# Patient Record
Sex: FEMALE | Race: WHITE | NOT HISPANIC OR LATINO | URBAN - METROPOLITAN AREA
[De-identification: names, ages, dates, MRNs, and addresses within clinical notes are randomized per-mention and may not be internally consistent; named-entity substitution may affect disease eponyms.]

---

## 2017-03-07 ENCOUNTER — OUTPATIENT (OUTPATIENT)
Dept: OUTPATIENT SERVICES | Facility: HOSPITAL | Age: 37
LOS: 1 days | End: 2017-03-07
Payer: COMMERCIAL

## 2017-03-07 DIAGNOSIS — O26.899 OTHER SPECIFIED PREGNANCY RELATED CONDITIONS, UNSPECIFIED TRIMESTER: ICD-10-CM

## 2017-03-07 DIAGNOSIS — Z3A.00 WEEKS OF GESTATION OF PREGNANCY NOT SPECIFIED: ICD-10-CM

## 2017-03-07 LAB
APPEARANCE UR: CLEAR — SIGNIFICANT CHANGE UP
BILIRUB UR-MCNC: NEGATIVE — SIGNIFICANT CHANGE UP
COLOR SPEC: YELLOW — SIGNIFICANT CHANGE UP
DIFF PNL FLD: (no result)
GLUCOSE UR QL: NEGATIVE — SIGNIFICANT CHANGE UP
KETONES UR-MCNC: NEGATIVE — SIGNIFICANT CHANGE UP
LEUKOCYTE ESTERASE UR-ACNC: NEGATIVE — SIGNIFICANT CHANGE UP
NITRITE UR-MCNC: NEGATIVE — SIGNIFICANT CHANGE UP
PH UR: 6.5 — SIGNIFICANT CHANGE UP (ref 4–8)
PROT UR-MCNC: NEGATIVE MG/DL — SIGNIFICANT CHANGE UP
SP GR SPEC: <=1.005 — SIGNIFICANT CHANGE UP (ref 1–1.03)
UROBILINOGEN FLD QL: 0.2 E.U./DL — SIGNIFICANT CHANGE UP

## 2017-03-07 PROCEDURE — 76817 TRANSVAGINAL US OBSTETRIC: CPT

## 2017-03-07 PROCEDURE — 87086 URINE CULTURE/COLONY COUNT: CPT

## 2017-03-07 PROCEDURE — 81003 URINALYSIS AUTO W/O SCOPE: CPT

## 2017-03-07 PROCEDURE — 81001 URINALYSIS AUTO W/SCOPE: CPT

## 2017-03-07 PROCEDURE — 99214 OFFICE O/P EST MOD 30 MIN: CPT

## 2017-03-07 PROCEDURE — 76818 FETAL BIOPHYS PROFILE W/NST: CPT

## 2017-03-07 RX ORDER — SODIUM CHLORIDE 9 MG/ML
1000 INJECTION, SOLUTION INTRAVENOUS ONCE
Qty: 0 | Refills: 0 | Status: COMPLETED | OUTPATIENT
Start: 2017-03-07 | End: 2017-03-07

## 2017-03-07 RX ADMIN — SODIUM CHLORIDE 2000 MILLILITER(S): 9 INJECTION, SOLUTION INTRAVENOUS at 16:26

## 2017-03-08 LAB
CULTURE RESULTS: NO GROWTH — SIGNIFICANT CHANGE UP
SPECIMEN SOURCE: SIGNIFICANT CHANGE UP

## 2017-05-19 ENCOUNTER — INPATIENT (INPATIENT)
Facility: HOSPITAL | Age: 37
LOS: 3 days | Discharge: HOME | End: 2017-05-23
Attending: OBSTETRICS & GYNECOLOGY | Admitting: OBSTETRICS & GYNECOLOGY
Payer: COMMERCIAL

## 2017-05-19 VITALS — WEIGHT: 158.07 LBS | HEIGHT: 61 IN

## 2017-05-19 LAB
BASOPHILS NFR BLD AUTO: 0.3 % — SIGNIFICANT CHANGE UP (ref 0–2)
BLD GP AB SCN SERPL QL: NEGATIVE — SIGNIFICANT CHANGE UP
EOSINOPHIL NFR BLD AUTO: 0.3 % — SIGNIFICANT CHANGE UP (ref 0–6)
HCT VFR BLD CALC: 33.9 % — LOW (ref 34.5–45)
HGB BLD-MCNC: 11.7 G/DL — SIGNIFICANT CHANGE UP (ref 11.5–15.5)
LYMPHOCYTES # BLD AUTO: 20.6 % — SIGNIFICANT CHANGE UP (ref 13–44)
MCHC RBC-ENTMCNC: 31.3 PG — SIGNIFICANT CHANGE UP (ref 27–34)
MCHC RBC-ENTMCNC: 34.5 G/DL — SIGNIFICANT CHANGE UP (ref 32–36)
MCV RBC AUTO: 90.6 FL — SIGNIFICANT CHANGE UP (ref 80–100)
MONOCYTES NFR BLD AUTO: 7.3 % — SIGNIFICANT CHANGE UP (ref 2–14)
NEUTROPHILS NFR BLD AUTO: 71.5 % — SIGNIFICANT CHANGE UP (ref 43–77)
PLATELET # BLD AUTO: 169 K/UL — SIGNIFICANT CHANGE UP (ref 150–400)
RBC # BLD: 3.74 M/UL — LOW (ref 3.8–5.2)
RBC # FLD: 13.9 % — SIGNIFICANT CHANGE UP (ref 10.3–16.9)
RH IG SCN BLD-IMP: POSITIVE — SIGNIFICANT CHANGE UP
WBC # BLD: 7.8 K/UL — SIGNIFICANT CHANGE UP (ref 3.8–10.5)
WBC # FLD AUTO: 7.8 K/UL — SIGNIFICANT CHANGE UP (ref 3.8–10.5)

## 2017-05-19 RX ORDER — METOCLOPRAMIDE HCL 10 MG
10 TABLET ORAL ONCE
Qty: 0 | Refills: 0 | Status: DISCONTINUED | OUTPATIENT
Start: 2017-05-19 | End: 2017-05-19

## 2017-05-19 RX ORDER — OXYTOCIN 10 UNIT/ML
41.67 VIAL (ML) INJECTION
Qty: 20 | Refills: 0 | Status: DISCONTINUED | OUTPATIENT
Start: 2017-05-19 | End: 2017-05-23

## 2017-05-19 RX ORDER — ACETAMINOPHEN 500 MG
650 TABLET ORAL EVERY 6 HOURS
Qty: 0 | Refills: 0 | Status: DISCONTINUED | OUTPATIENT
Start: 2017-05-19 | End: 2017-05-23

## 2017-05-19 RX ORDER — SODIUM CHLORIDE 9 MG/ML
1000 INJECTION, SOLUTION INTRAVENOUS ONCE
Qty: 0 | Refills: 0 | Status: COMPLETED | OUTPATIENT
Start: 2017-05-19 | End: 2017-05-19

## 2017-05-19 RX ORDER — IBUPROFEN 200 MG
600 TABLET ORAL EVERY 6 HOURS
Qty: 0 | Refills: 0 | Status: DISCONTINUED | OUTPATIENT
Start: 2017-05-19 | End: 2017-05-23

## 2017-05-19 RX ORDER — CITRIC ACID/SODIUM CITRATE 300-500 MG
30 SOLUTION, ORAL ORAL ONCE
Qty: 0 | Refills: 0 | Status: COMPLETED | OUTPATIENT
Start: 2017-05-19 | End: 2017-05-19

## 2017-05-19 RX ORDER — GENTAMICIN SULFATE 40 MG/ML
210 VIAL (ML) INJECTION ONCE
Qty: 0 | Refills: 0 | Status: DISCONTINUED | OUTPATIENT
Start: 2017-05-19 | End: 2017-05-19

## 2017-05-19 RX ORDER — NALOXONE HYDROCHLORIDE 4 MG/.1ML
0.1 SPRAY NASAL
Qty: 0 | Refills: 0 | Status: DISCONTINUED | OUTPATIENT
Start: 2017-05-19 | End: 2017-05-23

## 2017-05-19 RX ORDER — HEPARIN SODIUM 5000 [USP'U]/ML
5000 INJECTION INTRAVENOUS; SUBCUTANEOUS EVERY 12 HOURS
Qty: 0 | Refills: 0 | Status: DISCONTINUED | OUTPATIENT
Start: 2017-05-20 | End: 2017-05-23

## 2017-05-19 RX ORDER — OXYTOCIN 10 UNIT/ML
333.33 VIAL (ML) INJECTION
Qty: 20 | Refills: 0 | Status: DISCONTINUED | OUTPATIENT
Start: 2017-05-19 | End: 2017-05-23

## 2017-05-19 RX ORDER — SODIUM CHLORIDE 9 MG/ML
1000 INJECTION, SOLUTION INTRAVENOUS
Qty: 0 | Refills: 0 | Status: DISCONTINUED | OUTPATIENT
Start: 2017-05-19 | End: 2017-05-19

## 2017-05-19 RX ORDER — DOCUSATE SODIUM 100 MG
100 CAPSULE ORAL
Qty: 0 | Refills: 0 | Status: DISCONTINUED | OUTPATIENT
Start: 2017-05-19 | End: 2017-05-23

## 2017-05-19 RX ORDER — GLYCERIN ADULT
1 SUPPOSITORY, RECTAL RECTAL AT BEDTIME
Qty: 0 | Refills: 0 | Status: DISCONTINUED | OUTPATIENT
Start: 2017-05-19 | End: 2017-05-23

## 2017-05-19 RX ORDER — SIMETHICONE 80 MG/1
80 TABLET, CHEWABLE ORAL EVERY 4 HOURS
Qty: 0 | Refills: 0 | Status: DISCONTINUED | OUTPATIENT
Start: 2017-05-19 | End: 2017-05-23

## 2017-05-19 RX ORDER — DIPHENHYDRAMINE HCL 50 MG
25 CAPSULE ORAL EVERY 6 HOURS
Qty: 0 | Refills: 0 | Status: DISCONTINUED | OUTPATIENT
Start: 2017-05-19 | End: 2017-05-23

## 2017-05-19 RX ORDER — SODIUM CHLORIDE 9 MG/ML
1000 INJECTION, SOLUTION INTRAVENOUS
Qty: 0 | Refills: 0 | Status: DISCONTINUED | OUTPATIENT
Start: 2017-05-19 | End: 2017-05-23

## 2017-05-19 RX ORDER — LANOLIN
1 OINTMENT (GRAM) TOPICAL
Qty: 0 | Refills: 0 | Status: DISCONTINUED | OUTPATIENT
Start: 2017-05-19 | End: 2017-05-23

## 2017-05-19 RX ORDER — ONDANSETRON 8 MG/1
4 TABLET, FILM COATED ORAL EVERY 6 HOURS
Qty: 0 | Refills: 0 | Status: DISCONTINUED | OUTPATIENT
Start: 2017-05-19 | End: 2017-05-23

## 2017-05-19 RX ORDER — TETANUS TOXOID, REDUCED DIPHTHERIA TOXOID AND ACELLULAR PERTUSSIS VACCINE, ADSORBED 5; 2.5; 8; 8; 2.5 [IU]/.5ML; [IU]/.5ML; UG/.5ML; UG/.5ML; UG/.5ML
0.5 SUSPENSION INTRAMUSCULAR ONCE
Qty: 0 | Refills: 0 | Status: COMPLETED | OUTPATIENT
Start: 2017-05-19

## 2017-05-19 RX ORDER — FERROUS SULFATE 325(65) MG
325 TABLET ORAL DAILY
Qty: 0 | Refills: 0 | Status: DISCONTINUED | OUTPATIENT
Start: 2017-05-19 | End: 2017-05-23

## 2017-05-19 RX ADMIN — SODIUM CHLORIDE 125 MILLILITER(S): 9 INJECTION, SOLUTION INTRAVENOUS at 10:11

## 2017-05-19 RX ADMIN — SODIUM CHLORIDE 2000 MILLILITER(S): 9 INJECTION, SOLUTION INTRAVENOUS at 10:00

## 2017-05-19 RX ADMIN — Medication 100 MILLIGRAM(S): at 12:31

## 2017-05-19 RX ADMIN — Medication 1000 MILLIUNIT(S)/MIN: at 13:58

## 2017-05-19 RX ADMIN — Medication 100 MILLIGRAM(S): at 23:20

## 2017-05-19 RX ADMIN — Medication 600 MILLIGRAM(S): at 23:20

## 2017-05-19 RX ADMIN — Medication 125 MILLIUNIT(S)/MIN: at 14:12

## 2017-05-19 RX ADMIN — Medication 30 MILLILITER(S): at 12:32

## 2017-05-19 RX ADMIN — Medication 125 MILLIUNIT(S)/MIN: at 13:58

## 2017-05-20 LAB
HCT VFR BLD CALC: 30.5 % — LOW (ref 34.5–45)
HGB BLD-MCNC: 10.1 G/DL — LOW (ref 11.5–15.5)
MCHC RBC-ENTMCNC: 30.8 PG — SIGNIFICANT CHANGE UP (ref 27–34)
MCHC RBC-ENTMCNC: 33.1 G/DL — SIGNIFICANT CHANGE UP (ref 32–36)
MCV RBC AUTO: 93 FL — SIGNIFICANT CHANGE UP (ref 80–100)
PLATELET # BLD AUTO: 130 K/UL — LOW (ref 150–400)
RBC # BLD: 3.28 M/UL — LOW (ref 3.8–5.2)
RBC # FLD: 13.5 % — SIGNIFICANT CHANGE UP (ref 10.3–16.9)
T PALLIDUM AB TITR SER: NEGATIVE — SIGNIFICANT CHANGE UP
WBC # BLD: 8.3 K/UL — SIGNIFICANT CHANGE UP (ref 3.8–10.5)
WBC # FLD AUTO: 8.3 K/UL — SIGNIFICANT CHANGE UP (ref 3.8–10.5)

## 2017-05-20 RX ORDER — TETANUS TOXOID, REDUCED DIPHTHERIA TOXOID AND ACELLULAR PERTUSSIS VACCINE, ADSORBED 5; 2.5; 8; 8; 2.5 [IU]/.5ML; [IU]/.5ML; UG/.5ML; UG/.5ML; UG/.5ML
0.5 SUSPENSION INTRAMUSCULAR ONCE
Qty: 0 | Refills: 0 | Status: COMPLETED | OUTPATIENT
Start: 2017-05-20 | End: 2017-05-20

## 2017-05-20 RX ADMIN — HEPARIN SODIUM 5000 UNIT(S): 5000 INJECTION INTRAVENOUS; SUBCUTANEOUS at 13:19

## 2017-05-20 RX ADMIN — Medication 100 MILLIGRAM(S): at 13:18

## 2017-05-20 RX ADMIN — Medication 325 MILLIGRAM(S): at 13:18

## 2017-05-20 RX ADMIN — TETANUS TOXOID, REDUCED DIPHTHERIA TOXOID AND ACELLULAR PERTUSSIS VACCINE, ADSORBED 0.5 MILLILITER(S): 5; 2.5; 8; 8; 2.5 SUSPENSION INTRAMUSCULAR at 22:54

## 2017-05-20 RX ADMIN — Medication 600 MILLIGRAM(S): at 13:18

## 2017-05-20 RX ADMIN — Medication 600 MILLIGRAM(S): at 14:00

## 2017-05-20 RX ADMIN — HEPARIN SODIUM 5000 UNIT(S): 5000 INJECTION INTRAVENOUS; SUBCUTANEOUS at 02:53

## 2017-05-20 RX ADMIN — Medication 600 MILLIGRAM(S): at 00:12

## 2017-05-20 RX ADMIN — Medication 600 MILLIGRAM(S): at 06:43

## 2017-05-20 RX ADMIN — Medication 100 MILLIGRAM(S): at 04:35

## 2017-05-20 RX ADMIN — Medication 600 MILLIGRAM(S): at 19:51

## 2017-05-20 RX ADMIN — Medication 600 MILLIGRAM(S): at 07:30

## 2017-05-20 RX ADMIN — Medication 1 TABLET(S): at 13:18

## 2017-05-20 NOTE — PROGRESS NOTE ADULT - ASSESSMENT
post-op day #1 s/p repeat scheduled  section  1. Vital signs stable, continue to monitor per protocol  2. Pain controlled with oral pain medication  3. DVT prophylaxis: scd and heparin  4. GI/: clear liquid diet until flatus  5. Disposition: oob and ambulate. cbc pending.

## 2017-05-20 NOTE — PROVIDER CONTACT NOTE (CHANGE IN STATUS NOTIFICATION) - SITUATION
pt observed bleeding while transferring from chair to bed; pt's dov with a 40% saturation; clot observed coming out; pt denies dizziness and lightheadedness

## 2017-05-20 NOTE — PROGRESS NOTE ADULT - SUBJECTIVE AND OBJECTIVE BOX
Patient evaluated at bedside.   She reports pain is well controlled with oral pain medication  She denies headache, dizziness, chest pain, palpitations, shortness of breathe, nausea, vomiting or heavy vaginal bleeding.  She has been out of bed to chair, and feeling well. No chestpain, shortness of breath, or palpitations. She is tolerating clear liquid diet. She is  breastfeeding.    Physical Exam:  Vital Signs Last 24 Hrs  T(C): 36.7, Max: 37.1 (05-19 @ 13:45)  T(F): 98, Max: 98.8 (05-19 @ 13:45)  HR: 64 (56 - 92)  BP: 94/60 (83/53 - 127/78)  BP(mean): --  RR: 18 (14 - 19)  SpO2: 96% (95% - 100%)    GA: NAD, A+0 x 3  CV: RRR  Pulm: CTAB  Breasts: soft, nontender, no palpable masses  Abd: + BS, soft, nontender, nondistended, no rebound or guarding, incision clean, dry and intact, uterus firm at midline, 2 fb below umbilicus  : lochia wnl. Bimanual exam performed, as nurse expressed large clot. Cervix 1cm dilated. No active bleeding with uterine massage.   Adler: draining clear yellow urine with adequate output  Extremities: no swelling or calf tenderness, reflexes +2 bilaterally                            11.7   7.8   )-----------( 169      ( 19 May 2017 10:30 )             33.9

## 2017-05-21 RX ADMIN — HEPARIN SODIUM 5000 UNIT(S): 5000 INJECTION INTRAVENOUS; SUBCUTANEOUS at 00:27

## 2017-05-21 RX ADMIN — Medication 100 MILLIGRAM(S): at 14:21

## 2017-05-21 RX ADMIN — Medication 600 MILLIGRAM(S): at 19:30

## 2017-05-21 RX ADMIN — Medication 600 MILLIGRAM(S): at 11:00

## 2017-05-21 RX ADMIN — Medication 600 MILLIGRAM(S): at 10:12

## 2017-05-21 RX ADMIN — Medication 650 MILLIGRAM(S): at 06:29

## 2017-05-21 RX ADMIN — HEPARIN SODIUM 5000 UNIT(S): 5000 INJECTION INTRAVENOUS; SUBCUTANEOUS at 14:20

## 2017-05-21 RX ADMIN — Medication 600 MILLIGRAM(S): at 01:37

## 2017-05-21 RX ADMIN — Medication 1 TABLET(S): at 14:20

## 2017-05-21 RX ADMIN — Medication 325 MILLIGRAM(S): at 14:20

## 2017-05-21 RX ADMIN — Medication 600 MILLIGRAM(S): at 02:07

## 2017-05-21 RX ADMIN — Medication 600 MILLIGRAM(S): at 18:45

## 2017-05-21 NOTE — PROGRESS NOTE ADULT - ASSESSMENT
37 year old patient post-op day #2 s/p repeat scheduled CS  1. Vital signs stable, continue to monitor per protocol  2. Pain controlled with oral pain medication  3. DVT prophylaxis: scd and heparin  4. GI/: regular diet  5. Disposition: out of bed and ambulate. routine post-op care

## 2017-05-21 NOTE — PROGRESS NOTE ADULT - SUBJECTIVE AND OBJECTIVE BOX
Patient evaluated at bedside.   She reports pain is well controlled with oral pain medication  She denies headache, dizziness, chest pain, palpitations, shortness of breathe, nausea, vomiting or heavy vaginal bleeding.  She has been ambulating without assistance, voiding spontaneously,  not yet passing gas, tolerating clear diet and is breastfeeding.    Physical Exam:  Vital Signs Last 24 Hrs  T(C): 36.6, Max: 36.8 (05-20 @ 20:57)  T(F): 97.8, Max: 98.2 (05-20 @ 20:57)  HR: 77 (72 - 77)  BP: 94/64 (89/56 - 100/64)  BP(mean): --  RR: 16 (16 - 20)  SpO2: 96% (96% - 96%)    GA: NAD, A+0 x 3  CV: RRR  Pulm: CTAB  Breasts: soft, nontender, no palpable masses  Abd: + BS, soft, nontender, nondistended, no rebound or guarding, incision clean, dry and intact, uterus firm at midline, 2 fb below umbilicus  : lochia WNL  Adler: removed and patient voiding  Extremities: no swelling or calf tenderness, reflexes +2 bilaterally                            10.1   8.3   )-----------( 130      ( 20 May 2017 15:42 )             30.5

## 2017-05-22 RX ORDER — OXYCODONE HYDROCHLORIDE 5 MG/1
1 TABLET ORAL
Qty: 0 | Refills: 0 | COMMUNITY
Start: 2017-05-22

## 2017-05-22 RX ORDER — ACETAMINOPHEN 500 MG
2 TABLET ORAL
Qty: 0 | Refills: 0 | COMMUNITY
Start: 2017-05-22

## 2017-05-22 RX ORDER — IBUPROFEN 200 MG
1 TABLET ORAL
Qty: 0 | Refills: 0 | COMMUNITY
Start: 2017-05-22

## 2017-05-22 RX ORDER — OXYCODONE HYDROCHLORIDE 5 MG/1
2 TABLET ORAL
Qty: 0 | Refills: 0 | COMMUNITY
Start: 2017-05-22

## 2017-05-22 RX ADMIN — Medication 600 MILLIGRAM(S): at 13:30

## 2017-05-22 RX ADMIN — HEPARIN SODIUM 5000 UNIT(S): 5000 INJECTION INTRAVENOUS; SUBCUTANEOUS at 12:47

## 2017-05-22 RX ADMIN — HEPARIN SODIUM 5000 UNIT(S): 5000 INJECTION INTRAVENOUS; SUBCUTANEOUS at 00:23

## 2017-05-22 RX ADMIN — Medication 600 MILLIGRAM(S): at 18:26

## 2017-05-22 RX ADMIN — Medication 325 MILLIGRAM(S): at 12:47

## 2017-05-22 RX ADMIN — Medication 600 MILLIGRAM(S): at 07:13

## 2017-05-22 RX ADMIN — Medication 100 MILLIGRAM(S): at 12:47

## 2017-05-22 RX ADMIN — Medication 1 TABLET(S): at 12:47

## 2017-05-22 RX ADMIN — Medication 600 MILLIGRAM(S): at 06:43

## 2017-05-22 RX ADMIN — Medication 600 MILLIGRAM(S): at 12:47

## 2017-05-22 RX ADMIN — Medication 600 MILLIGRAM(S): at 00:25

## 2017-05-22 RX ADMIN — Medication 600 MILLIGRAM(S): at 00:55

## 2017-05-22 RX ADMIN — Medication 600 MILLIGRAM(S): at 19:00

## 2017-05-22 NOTE — PROGRESS NOTE ADULT - SUBJECTIVE AND OBJECTIVE BOX
Patient evaluated at bedside.    She reports pain is well controlled with oral pain medication  She denies headache, dizziness, chest pain, palpitations, shortness of breathe, nausea, vomiting or heavy vaginal bleeding.  She has been ambulating without assistance, voiding spontaneously, passing gas, tolerating regular diet and is breastfeeding.     Physical Exam:  Vital Signs Last 24 Hrs  T(C): 36.7, Max: 36.7 (05-22 @ 06:03)  T(F): 98, Max: 98 (05-22 @ 06:03)  HR: 65 (64 - 65)  BP: 105/73 (105/73 - 119/81)  BP(mean): --  RR: 16 (16 - 16)  SpO2: 97% (97% - 98%)    GA: NAD, A+0 x 3  CV: RRR  Pulm: CTAB  Breasts: soft, nontender, no palpable masses  Abd: + BS, soft, nontender, nondistended, no rebound or guarding, incision clean, dry and intact, uterus firm at midline, 2 fb below umbilicus  : lochia WNL  Adler: VOIDING FREELY  Extremities: no swelling or calf tenderness, reflexes +2 bilaterally                            10.1   8.3   )-----------( 130      ( 20 May 2017 15:42 )             30.5

## 2017-05-22 NOTE — DISCHARGE NOTE OB - CARE PROVIDER_API CALL
Qamar Eduardo (MD), Obstetrics and Gynecology  [address] [place] [address], [place] [id number]  Phone: [phone]  Fax: [phone]

## 2017-05-22 NOTE — PROGRESS NOTE ADULT - ASSESSMENT
37 year old patient post-op day #3 s/p repeat  section at term  1. Vital signs stable, continue to monitor per protocol  2. Pain controlled with oral pain meds  3. DVT prophylaxis: scd and heparin. patient ambulating well  4. GI/: regular diet. patient passing flatus.   5. Disposition: anticipate DC tomorrow.

## 2017-05-22 NOTE — DISCHARGE NOTE OB - PLAN OF CARE
n/a Please see Dr. Eduardo in 2 weeks for a post-op check .Call the office for an appointment. Call the office if you experience severe pain, fever over 100.4, or severe nausea and vomiting.

## 2017-05-22 NOTE — DISCHARGE NOTE OB - CARE PLAN
Principal Discharge DX:	Postpartum state  Goal:	n/a  Instructions for follow-up, activity and diet:	Please see Dr. Eduardo in 2 weeks for a post-op check .Call the office for an appointment. Call the office if you experience severe pain, fever over 100.4, or severe nausea and vomiting.

## 2017-05-22 NOTE — DISCHARGE NOTE OB - PATIENT PORTAL LINK FT
“You can access the FollowHealth Patient Portal, offered by [facility], by registering with the following website: [email or web address]”

## 2017-05-22 NOTE — DISCHARGE NOTE OB - HOSPITAL COURSE
Patient presented for scheduled repeat  section. Uncomplicated surgical procedure. Patient had uneventful post-operative course. Discharged home in stable condition on post-op day #4 after meeting all post-operative milestones.

## 2017-05-22 NOTE — DISCHARGE NOTE OB - MEDICATION SUMMARY - MEDICATIONS TO TAKE
I will START or STAY ON the medications listed below when I get home from the hospital:    acetaminophen 325 mg oral tablet  -- 2 tab(s) by mouth every 6 hours, As needed, For Temp greater than 38.5 C (101.3 F)  -- Indication: For Fever    acetaminophen-oxycodone 325 mg-5 mg oral tablet  -- 1 tab(s) by mouth every 3 hours, As needed, Moderate Pain  -- Indication: For Pain    acetaminophen-oxycodone 325 mg-5 mg oral tablet  -- 2 tab(s) by mouth every 6 hours, As needed, Severe Pain  -- Indication: For Pain    ibuprofen 600 mg oral tablet  -- 1 tab(s) by mouth every 6 hours, As needed, Mild pain or headache  -- Indication: For Pain

## 2017-05-23 VITALS
TEMPERATURE: 98 F | SYSTOLIC BLOOD PRESSURE: 119 MMHG | DIASTOLIC BLOOD PRESSURE: 82 MMHG | HEART RATE: 60 BPM | RESPIRATION RATE: 18 BRPM | OXYGEN SATURATION: 98 %

## 2017-05-23 PROCEDURE — 90715 TDAP VACCINE 7 YRS/> IM: CPT

## 2017-05-23 PROCEDURE — 86850 RBC ANTIBODY SCREEN: CPT

## 2017-05-23 PROCEDURE — 85027 COMPLETE CBC AUTOMATED: CPT

## 2017-05-23 PROCEDURE — 85025 COMPLETE CBC W/AUTO DIFF WBC: CPT

## 2017-05-23 PROCEDURE — C1889: CPT

## 2017-05-23 PROCEDURE — 86901 BLOOD TYPING SEROLOGIC RH(D): CPT

## 2017-05-23 PROCEDURE — 86780 TREPONEMA PALLIDUM: CPT

## 2017-05-23 PROCEDURE — 86900 BLOOD TYPING SEROLOGIC ABO: CPT

## 2017-05-23 RX ADMIN — Medication 600 MILLIGRAM(S): at 00:40

## 2017-05-23 RX ADMIN — Medication 600 MILLIGRAM(S): at 12:15

## 2017-05-23 RX ADMIN — Medication 600 MILLIGRAM(S): at 05:42

## 2017-05-23 RX ADMIN — HEPARIN SODIUM 5000 UNIT(S): 5000 INJECTION INTRAVENOUS; SUBCUTANEOUS at 00:09

## 2017-05-23 RX ADMIN — Medication 325 MILLIGRAM(S): at 11:24

## 2017-05-23 RX ADMIN — Medication 600 MILLIGRAM(S): at 00:10

## 2017-05-23 RX ADMIN — Medication 100 MILLIGRAM(S): at 11:24

## 2017-05-23 RX ADMIN — Medication 600 MILLIGRAM(S): at 06:15

## 2017-05-23 RX ADMIN — Medication 600 MILLIGRAM(S): at 11:24

## 2017-05-23 RX ADMIN — Medication 1 TABLET(S): at 11:24

## 2017-05-25 DIAGNOSIS — Z34.83 ENCOUNTER FOR SUPERVISION OF OTHER NORMAL PREGNANCY, THIRD TRIMESTER: ICD-10-CM

## 2017-05-25 DIAGNOSIS — Z88.0 ALLERGY STATUS TO PENICILLIN: ICD-10-CM

## 2017-05-25 DIAGNOSIS — O34.211 MATERNAL CARE FOR LOW TRANSVERSE SCAR FROM PREVIOUS CESAREAN DELIVERY: ICD-10-CM

## 2017-05-25 DIAGNOSIS — Z3A.39 39 WEEKS GESTATION OF PREGNANCY: ICD-10-CM

## 2021-03-25 ENCOUNTER — EMERGENCY (EMERGENCY)
Facility: HOSPITAL | Age: 41
LOS: 1 days | Discharge: HOME | End: 2021-03-25
Admitting: EMERGENCY MEDICINE
Payer: COMMERCIAL

## 2021-03-25 VITALS
OXYGEN SATURATION: 97 % | RESPIRATION RATE: 16 BRPM | HEART RATE: 75 BPM | DIASTOLIC BLOOD PRESSURE: 84 MMHG | HEIGHT: 61 IN | TEMPERATURE: 98 F | SYSTOLIC BLOOD PRESSURE: 116 MMHG

## 2021-03-25 LAB — SARS-COV-2 RNA SPEC QL NAA+PROBE: SIGNIFICANT CHANGE UP

## 2021-03-25 PROCEDURE — 99283 EMERGENCY DEPT VISIT LOW MDM: CPT

## 2021-03-25 PROCEDURE — U0003: CPT

## 2021-03-25 PROCEDURE — U0005: CPT

## 2021-03-25 PROCEDURE — 99282 EMERGENCY DEPT VISIT SF MDM: CPT

## 2021-03-25 NOTE — ED PROVIDER NOTE - PATIENT PORTAL LINK FT
You can access the FollowMyHealth Patient Portal offered by [facility] by registering at the following website: [email or web address]. By joining [employer]’s FollowMyHealth portal, you will also be able to view your health information using other applications (apps) compatible with our system.

## 2021-03-28 DIAGNOSIS — Z20.822 CONTACT WITH AND (SUSPECTED) EXPOSURE TO COVID-19: ICD-10-CM

## 2021-03-28 DIAGNOSIS — Z88.0 ALLERGY STATUS TO PENICILLIN: ICD-10-CM
